# Patient Record
(demographics unavailable — no encounter records)

---

## 2025-01-16 NOTE — PHYSICAL EXAM
[Alert] : alert [Healthy Appearance] : healthy appearance [No Acute Distress] : no acute distress [No Proptosis] : no proptosis [No Lid Lag] : no lid lag [Thyroid Not Enlarged] : the thyroid was not enlarged [No Thyroid Nodules] : no palpable thyroid nodules [No Respiratory Distress] : no respiratory distress [No Accessory Muscle Use] : no accessory muscle use [Clear to Auscultation] : lungs were clear to auscultation bilaterally [Normal S1, S2] : normal S1 and S2 [No Murmurs] : no murmurs [Regular Rhythm] : with a regular rhythm [Soft] : abdomen soft [No CVA Tenderness] : no ~M costovertebral angle tenderness [No Stigmata of Cushings Syndrome] : no stigmata of Cushings Syndrome [Oriented x3] : oriented to person, place, and time

## 2025-01-16 NOTE — DATA REVIEWED
[FreeTextEntry1] : 12/2023: A1c 8.1% TSH 0.01  Ft4 2   glucose 172  crea 0.64    5/2024: A1c 8.2% TSH 00  ft4 2.2  ldl 95  tg 91  glucose 197  gfr 101  crea 0.7  b12 520  acr negative   9/2024:  A1c 8.2% glucose 127  crea 0.67    calcium 9.2  TSH 0.02  FT4 1.8  LDL 84  Tg 70  1/2025: TSH 0.01  Ft4 1.8  Tg 129 glucose 141  crea 0.61   c peptid 1.04 anti FREDERICK positive > 250

## 2025-01-16 NOTE — ASSESSMENT
[FreeTextEntry1] : Ms. TANNER LOZOYA  Is  a 58 year  old female  who presented for follow up evaluation of type 2 Diabetes:   # type 2 DM / DL/ RJ antibodies positive 12/2024  - A1c 7.6%( 12/2024) on   metformin 1000 mg bid,  farxiga 10 mg daily  , glimepiride 1 mG daily, Toujeo 20 units, Mounjaro 5 mg every other week  SMBG/CGM :  has freestyle toña 3 spike no data now  - continue metformin 1000 mg BID  - continue farxiga 10 mg daily  - continue glimpeiride 1 mg daily  - increase Toujeo to 20  units daily  - discussed need for meals time insulin if continue to have post meals spikes , recheck anti rj and islet cell  - continue statin  20 mg daily  -ACR ok  -Eye examination: followed  -Neuropathy: no      #hypothyroidism : - TSH low  and Ft4 ok on Lt4 125 mcg daily , TSH 0.01 - lower levothyroxine to 112 mcg daily

## 2025-01-16 NOTE — HISTORY OF PRESENT ILLNESS
[Ligia] : Ligia [FreeTextEntry1] : Ms. TANNER LOZOYA  Is  a 58 year  old female  who presented for f/u  evaluation of type 2 Diabetes / TANISHA    Diagnosis: few years ago 12/2024: FREDERICK antibodies positive  Current Regimen:  metformin 1000 mg bid, farxiga 10 mg daily, glimepiride 1 mg daily , toujeo 20 units at bedtime , Mounjaro 5 mg every other week  Previous regimens: Janumet , glimepiride 2 mg daily , did not tolerate trulicity  Compliance: good  SMBG/CGM :  has freestyle otña 3  Hypoglycemia:  no  Polyuria/polydipsia : no  Weight change/BMI: BMI 23.6....BMI 24.3 Diet: try to limit carbs  Exercise: active  HBa1c trend: 8.1%( 12/2023) ...8.2%( 5/2024) ...8.2%( 9/2024)...7.6%( 12/2024)    .prevention   Statin: rosuvastatin 20 mg daily  ACE/ARB : valsartan 40 mg daily  Eye examination: followed  Neuropathy: no  CODY: ok     #hypothyroidism : - on levothyroxine 125 mcg daily  - TSH low and Ft4 improved 1.8     [Hypoglycemia] : Patient is not hypoglycemic.

## 2025-05-29 NOTE — DATA REVIEWED
[FreeTextEntry1] : 12/2023: A1c 8.1% TSH 0.01  Ft4 2   glucose 172  crea 0.64    5/2024: A1c 8.2% TSH 00  ft4 2.2  ldl 95  tg 91  glucose 197  gfr 101  crea 0.7  b12 520  acr negative   9/2024:  A1c 8.2% glucose 127  crea 0.67    calcium 9.2  TSH 0.02  FT4 1.8  LDL 84  Tg 70  1/2025:  A1c 6.9%  TSH 0.01  Ft4 1.8  Tg 129 glucose 141  crea 0.61   c peptid 1.04 anti FREDERICK positive > 250   ldl 131  tg 86  glucose 106  crea 0.53   gfr 107 TSH  0.01  ft4 1.9   4/2025:  ACR 3 anti FREDERICK positive anti ZN transporter negative insulinantibodies and IA 2 negative islet cell negative

## 2025-05-29 NOTE — ASSESSMENT
[FreeTextEntry1] : Ms. TANNER LOZOYA  Is  a 58 year  old female  who presented for follow up evaluation of type 2 Diabetes:   # type 2 DM / DL/ FREDERICK antibodies positive 12/2024  - A1c 7.6%( 12/2024) down to 6.9%( 5/2025) on   metformin 1000 mg bid,  farxiga 10 mg daily  , glimepiride 1 mG BID , Toujeo 20 units, Mounjaro 5 mg every other week  SMBG/CGM :  using Dexcom G7  - continue metformin 1000 mg BID  - continue farxiga 10 mg daily  - continue glimpeiride 1 mg BID  - continue Toujeo 10 20  units daily  - continue  Mounjaro 2.5 mg Q week  - discussed need for meals time insulin if continue to have post meals spikes   - continue statin  20 mg daily was not taking regularly now back  -ACR ok  -Eye examination: followed  -Neuropathy: no      #hypothyroidism : - TSH low  and Ft4 1.9 on Lt4 112 mcg  - lower levothyroxine to 100 mcg daily

## 2025-05-29 NOTE — HISTORY OF PRESENT ILLNESS
[Ligia] : Ligia [FreeTextEntry1] : Ms. TANNER LOZOYA  Is  a 58 year  old female  who presented for f/u  evaluation of type 2 Diabetes / TANISHA    Diagnosis: few years ago 12/2024: FREDERICK antibodies positive  Current Regimen:  metformin 1000 mg bid, farxiga 10 mg daily, glimepiride 1 mg BID , toujeo 10-20 units at bedtime , Mounjaro 2.5 mg Q week  Previous regimens: Janumet , glimepiride 2 mg daily , did not tolerate trulicity  Compliance: good  SMBG/CGM :  has freestyle toña 3  Hypoglycemia:  no  Polyuria/polydipsia : no  Weight change/BMI: BMI 23.6....BMI 24.3 Diet: try to limit carbs  Exercise: active  HBa1c trend: 8.1%( 12/2023) ...8.2%( 5/2024) ...8.2%( 9/2024)...7.6%( 12/2024) ...6.9%( 5/2025 )   .prevention   Statin: rosuvastatin 20 mg daily  ACE/ARB : valsartan 40 mg daily  Eye examination: followed  Neuropathy: no  CODY: ok     #hypothyroidism : - on levothyroxine 125 mcg daily  - 5/2025: TSH low and Ft4 at 1.9      [Hypoglycemia] : Patient is not hypoglycemic.

## 2025-06-20 NOTE — DISCUSSION/SUMMARY
[FreeTextEntry1] : 58 YEAR OLD FEMALE LMP 2018 PRESENTS FOR WELL WOMAN ANNUAL GYNECOLOGIC EXAMINATION AND PAP. LAST SEEN FOR WELL WOMAN VISIT 1/22/2024; PAP AND HPV  HR TESTING DONE AT THAT TIME WERE BOTH NEGATIVE. NO NEW MEDICAL OR  SURGICAL HISTORY EXCEPT FOR HAVING BEEN SEEN A FEW MONTHS AGO FOR LLQ PAIN AND HAVING HAD TV US DONE 3/19/2025 WITH FINDING OF SMALL 1.1 CM RT CYST AND BEING VERY "GASEY"./ MEDICAITONS; NOW ON MONJARO 25 MG INJECTIONS WEEKLY FOR 1 MONTH OR SO                             METFORMIN 1000GM BID                             GLIMEPRIMIDE 1 MG BID                            LEVOTHRYOXIN 100 MG                            FARXIGA 10 MG                            VALSOARTAN 40 MG                            ROSUVASTATIN 20 MG MEDICATION ALLERGIES; NONE ROS;BMS-NORMAL; NO FAM HISTORY OF COLON CANCER; LAST COLONOSCOPY 12/2024           + OVERFLOW STRESS URINARY INCONTIENENCE           SEXUALLY ACTIVE WITHOUT COMPLAINTS BREASTS; DOES BREAST SELF EXAMINATION                     LAST MAMMOGRPAHY DONE AT  [facility] 2/282025 [facility]                     PATERNAL GRANDMOTHER WITH HISTORY OF BREAST CANCER IN LATE 70'S DOES NOT EXERCISE; NO MULTIVITAMINS; NO LANG. SUPPLEMENT; + DAIRY/YOGURT; NO TOB/V LAST BONE DENSITY TESTING DONE 4/1/2024 WAS NORMAL AT LS WITH LOSS, OSTEOPENIA, AT                      HIP WITH T SCORE -1.7  PE;BP;110/70; TEMP 97.7;WEIGHT 135 LBS HEIGHT 5'4"      BREASTS;NO MASSES OR DISCHARGES      ABDOMEN;SOFT, NO MASSES; NO TENDERNESS TO PALPATION      PELVIC NORMAL EXTERNAL GENITLIA                    NORMAL URETHRRAL MEATUS                    NORMAL VAGINA WITHOUT LESION                    1ST DEGREE CYSTOCELE                    NORMAL CERVIX , NO LESION                    ANTEVERTED UTERUS NORMAL IN SIZE ON BIMANUAL EXAMINATION                    NO PALPABLE ADNEXAL MASSES  IMP; WELL WOMAN         MENOPAUSE          OVERFLOW STRESS URINARY INCONTINENCE          OSTEOPENIA - HIP          CYSTOCELE          HYPERTENSION          HYPOTHYROID  PLAN; THIN PREP PAP WITH HR HPV TESTING DONE-PT TO CALL IN 2 WKS FOR RESULTS             MONTHLY BREAST SELF EXAMINATION CONTINUED TO BE  STRONGLY ADVISED             ANNUAL MAMMOGRPAHY ADIVSED AND REFERAL SCRIPT GIVEN             RETURN TO OFFIC EONE [facility]  OR [facility]